# Patient Record
(demographics unavailable — no encounter records)

---

## 2025-05-31 NOTE — REASON FOR VISIT
[Initial Evaluation] : an initial evaluation [Family Member] : family member [FreeTextEntry2] : two daughters

## 2025-05-31 NOTE — PHYSICAL EXAM
[Alert] : alert [Well Nourished] : well nourished [No Acute Distress] : in no acute distress [Well Developed] : well developed [No Respiratory Distress] : no respiratory distress [No Acc Muscle Use] : no accessory muscle use [Respiration, Rhythm And Depth] : normal respiratory rhythm and effort [Auscultation Breath Sounds / Voice Sounds] : lungs were clear to auscultation bilaterally [Edema] : edema was not present [Abdomen Tenderness] : non-tender [No Masses] : no abdominal mass palpated [Abdomen Soft] : soft [No Hernias] : no hernia was discovered [No Focal Deficits] : no focal deficits [Normal] : normal affect and normal mood [de-identified] : pt able to walk slowly with   walker - then returned to bed [de-identified] : very rapid irregular heart rate - erratic to 140s [de-identified] : tired

## 2025-05-31 NOTE — REVIEW OF SYSTEMS
[Feeling Poorly] : feeling poorly [Feeling Tired] : feeling tired [Recent Weight Loss (___ Lbs)] : recent [unfilled] ~Ulb weight loss [Heart Rate Is Fast] : fast heart rate [Limb Weakness] : limb weakness [Difficulty Walking] : difficulty walking [Sleep Disturbances] : sleep disturbances [Negative] : Heme/Lymph [Shortness Of Breath] : no shortness of breath [de-identified] : sleeping a lot

## 2025-05-31 NOTE — HISTORY OF PRESENT ILLNESS
[DNR] : DNR [I will adhere to the patient's wishes.] : I will adhere to the patient's wishes. [Time Spent: ___ minutes] : Time Spent: [unfilled] minutes [FreeTextEntry1] : pt is a 98 year old woman seen at home Dr Packer PCP - but not seen in months - [phone] unsure when las  thyroid tests dx chf, hypothryoid, gait disorder seen with two daughters Yumiko and Danyell a third daughter had requested I come (Ana Maria Diego) - there are five living daughters   18 years children take   turns caring for her, along with aides some days   was not   doing well - eating poorly, weak increased time in bed fell 5/11/25 hit head - no injury - at [facility] found to have UTI lost 12 lbs in 8 months in hospital taken off diltiazem and lasix treated for UTI made DNR and put on home hospice had cbc and cmet in hospital - wnl tsh   not checked [AdvancecareDate] : 5/30/25 [FreeTextEntry4] : on home hospice

## 2025-05-31 NOTE — ASSESSMENT
[FreeTextEntry1] : 98 year old woman hypothryoid on synthroid 112 mcg a day no recent thyroid tests that I could find in rapid a fib no sign of chf fatigue did not resolve with rest called in cardiazem 120 mg a day suggested family get pulse ox and bp cuff to monitor ? aspirin she has recently been started on hospice and family will decide which physician to follow pt and how to proceed